# Patient Record
Sex: MALE | URBAN - METROPOLITAN AREA
[De-identification: names, ages, dates, MRNs, and addresses within clinical notes are randomized per-mention and may not be internally consistent; named-entity substitution may affect disease eponyms.]

---

## 2019-03-25 ENCOUNTER — OFFICE VISIT (OUTPATIENT)
Dept: URBAN - METROPOLITAN AREA CLINIC 88 | Facility: CLINIC | Age: 59
End: 2019-03-25
Payer: MEDICARE

## 2019-03-25 DIAGNOSIS — H40.053 OCULAR HYPERTENSION, BILATERAL: ICD-10-CM

## 2019-03-25 DIAGNOSIS — H52.03 HYPERMETROPIA, BILATERAL: ICD-10-CM

## 2019-03-25 PROCEDURE — 92133 CPTRZD OPH DX IMG PST SGM ON: CPT | Performed by: OPHTHALMOLOGY

## 2019-03-25 PROCEDURE — 92004 COMPRE OPH EXAM NEW PT 1/>: CPT | Performed by: OPHTHALMOLOGY

## 2019-03-25 ASSESSMENT — KERATOMETRY
OD: 41.13
OS: 40.75

## 2019-03-25 ASSESSMENT — INTRAOCULAR PRESSURE
OD: 20
OS: 19

## 2019-03-25 ASSESSMENT — VISUAL ACUITY
OS: 20/25
OD: 20/20

## 2019-03-25 NOTE — IMPRESSION/PLAN
Impression: Dry eye syndrome of bilateral lacrimal glands: H04.123. OU. Condition: stable. Symptoms: will continue to monitor. Plan: Dry eyes account for the patient's complaints. There are no objective signs or evidence of permanent corneal damage. Suggested artificial tears for comfort.

## 2019-03-25 NOTE — IMPRESSION/PLAN
Impression: Ocular hypertension, bilateral: H40.053. OU. Condition: unstable. Symptoms: IOP is unstable. Plan: Ocular hypertension, intraocular pressure is slightly elevated. OCT ON today: glaucoma suspect. No diagnosis of glaucoma. Will not start medication and will observe.

## 2021-06-24 ENCOUNTER — OFFICE VISIT (OUTPATIENT)
Dept: URBAN - METROPOLITAN AREA CLINIC 88 | Facility: CLINIC | Age: 61
End: 2021-06-24
Payer: MEDICARE

## 2021-06-24 DIAGNOSIS — H17.89 OTHER CORNEAL SCARS AND OPACITIES: ICD-10-CM

## 2021-06-24 DIAGNOSIS — T15.02XA FOREIGN BODY IN CORNEA, LEFT EYE, INITIAL ENCOUNTER: Primary | ICD-10-CM

## 2021-06-24 DIAGNOSIS — H40.013 OPEN ANGLE WITH BORDERLINE FINDINGS, LOW RISK, BILATERAL: ICD-10-CM

## 2021-06-24 PROCEDURE — 65222 REMOVE FOREIGN BODY FROM EYE: CPT | Performed by: OPTOMETRIST

## 2021-06-24 RX ORDER — OFLOXACIN 3 MG/ML
0.3 % SOLUTION/ DROPS OPHTHALMIC
Qty: 5 | Refills: 0 | Status: ACTIVE
Start: 2021-06-24

## 2021-06-24 ASSESSMENT — VISUAL ACUITY
OD: 20/20
OS: 20/20

## 2021-06-24 ASSESSMENT — INTRAOCULAR PRESSURE: OD: 12

## 2021-06-24 NOTE — IMPRESSION/PLAN
Impression: Other corneal scars and opacities: H17.89. Plan: Discussed importance of eye protection.

## 2021-06-24 NOTE — IMPRESSION/PLAN
Impression: Open angle with borderline findings, low risk, bilateral: H40.013. Plan: Discussed status of examination with patient. OCT ON at prior visit. Monitor at scheduled appt with Dr. Jose L Pickens.

## 2021-06-24 NOTE — IMPRESSION/PLAN
Impression: Foreign body in cornea, left eye, initial encounter: T15.02xA. Plan: Discussed status with patient. With consent removal of metallic FB office with 30 gauge needle and algar brush. Will start Ofloxacin QID OS x 1 week. 1 gtt Ofloxacin after FB removal. Guarded prognosis and potential scar reviewed with pt. No complications observed. RTC if dry, redness or irritation experienced.

## 2021-06-30 ENCOUNTER — OFFICE VISIT (OUTPATIENT)
Dept: URBAN - METROPOLITAN AREA CLINIC 88 | Facility: CLINIC | Age: 61
End: 2021-06-30
Payer: MEDICARE

## 2021-06-30 DIAGNOSIS — H17.9 CORNEAL OPACITY: Primary | ICD-10-CM

## 2021-06-30 DIAGNOSIS — H04.123 DRY EYE SYNDROME OF BILATERAL LACRIMAL GLANDS: ICD-10-CM

## 2021-06-30 PROCEDURE — 99213 OFFICE O/P EST LOW 20 MIN: CPT | Performed by: OPHTHALMOLOGY

## 2021-06-30 ASSESSMENT — KERATOMETRY
OS: 40.50
OD: 40.75

## 2021-06-30 ASSESSMENT — INTRAOCULAR PRESSURE
OD: 17
OS: 15

## 2021-06-30 NOTE — IMPRESSION/PLAN
Impression: Corneal opacity: H17.9. Plan: Corneal abrasion healed. D/C Ofloxacin. Ok to use OTC ART tears for comfort. Recommend safety glasses.

## 2022-08-12 ENCOUNTER — OFFICE VISIT (OUTPATIENT)
Dept: URBAN - METROPOLITAN AREA CLINIC 88 | Facility: CLINIC | Age: 62
End: 2022-08-12
Payer: COMMERCIAL

## 2022-08-12 DIAGNOSIS — H17.89 OTHER CORNEAL SCARS AND OPACITIES: ICD-10-CM

## 2022-08-12 DIAGNOSIS — H40.013 OPEN ANGLE WITH BORDERLINE FINDINGS, LOW RISK, BILATERAL: Primary | ICD-10-CM

## 2022-08-12 DIAGNOSIS — H25.13 AGE-RELATED NUCLEAR CATARACT, BILATERAL: ICD-10-CM

## 2022-08-12 PROCEDURE — 99214 OFFICE O/P EST MOD 30 MIN: CPT | Performed by: OPHTHALMOLOGY

## 2022-08-12 ASSESSMENT — INTRAOCULAR PRESSURE
OD: 16
OS: 13

## 2022-08-12 ASSESSMENT — VISUAL ACUITY
OS: 20/20
OD: 20/20

## 2022-08-12 ASSESSMENT — KERATOMETRY
OS: 40.63
OD: 40.88

## 2023-07-21 ENCOUNTER — OFFICE VISIT (OUTPATIENT)
Dept: URBAN - METROPOLITAN AREA CLINIC 88 | Facility: CLINIC | Age: 63
End: 2023-07-21
Payer: COMMERCIAL

## 2023-07-21 DIAGNOSIS — H40.013 OPEN ANGLE WITH BORDERLINE FINDINGS, LOW RISK, BILATERAL: Primary | ICD-10-CM

## 2023-07-21 DIAGNOSIS — H04.123 DRY EYE SYNDROME OF BILATERAL LACRIMAL GLANDS: ICD-10-CM

## 2023-07-21 DIAGNOSIS — H25.13 AGE-RELATED NUCLEAR CATARACT, BILATERAL: ICD-10-CM

## 2023-07-21 DIAGNOSIS — H17.89 OTHER CORNEAL SCARS AND OPACITIES: ICD-10-CM

## 2023-07-21 PROCEDURE — 99214 OFFICE O/P EST MOD 30 MIN: CPT | Performed by: OPHTHALMOLOGY

## 2023-07-21 ASSESSMENT — INTRAOCULAR PRESSURE
OS: 14
OD: 11